# Patient Record
Sex: FEMALE | Employment: FULL TIME | URBAN - METROPOLITAN AREA
[De-identification: names, ages, dates, MRNs, and addresses within clinical notes are randomized per-mention and may not be internally consistent; named-entity substitution may affect disease eponyms.]

---

## 2018-04-24 ENCOUNTER — TRANSFERRED RECORDS (OUTPATIENT)
Dept: HEALTH INFORMATION MANAGEMENT | Facility: CLINIC | Age: 48
End: 2018-04-24

## 2018-06-01 ENCOUNTER — HOSPITAL ENCOUNTER (OUTPATIENT)
Facility: CLINIC | Age: 48
Discharge: HOME | End: 2018-06-02
Attending: OBSTETRICS & GYNECOLOGY | Admitting: OBSTETRICS & GYNECOLOGY
Payer: COMMERCIAL

## 2018-06-01 ENCOUNTER — ANESTHESIA EVENT (OUTPATIENT)
Dept: SURGERY | Facility: CLINIC | Age: 48
End: 2018-06-01
Payer: COMMERCIAL

## 2018-06-01 ENCOUNTER — SURGERY (OUTPATIENT)
Age: 48
End: 2018-06-01
Payer: COMMERCIAL

## 2018-06-01 ENCOUNTER — ANESTHESIA (OUTPATIENT)
Dept: SURGERY | Facility: CLINIC | Age: 48
End: 2018-06-01
Payer: COMMERCIAL

## 2018-06-01 DIAGNOSIS — N81.2 UTEROVAGINAL PROLAPSE, INCOMPLETE: Primary | ICD-10-CM

## 2018-06-01 LAB
ABO + RH BLD: NORMAL
ABO + RH BLD: NORMAL
B-HCG SERPL-ACNC: <1 IU/L (ref 0–5)
BLD GP AB SCN SERPL QL: NORMAL
BLOOD BANK CMNT PATIENT-IMP: NORMAL
HGB BLD-MCNC: 12 G/DL (ref 11.7–15.7)
SPECIMEN EXP DATE BLD: NORMAL

## 2018-06-01 PROCEDURE — 25000128 H RX IP 250 OP 636: Performed by: NURSE ANESTHETIST, CERTIFIED REGISTERED

## 2018-06-01 PROCEDURE — 71000013 ZZH RECOVERY PHASE 1 LEVEL 1 EA ADDTL HR: Performed by: OBSTETRICS & GYNECOLOGY

## 2018-06-01 PROCEDURE — 25000132 ZZH RX MED GY IP 250 OP 250 PS 637: Performed by: OBSTETRICS & GYNECOLOGY

## 2018-06-01 PROCEDURE — 40000936 ZZH STATISTIC OUTPATIENT (NON-OBS) NIGHT

## 2018-06-01 PROCEDURE — 25000128 H RX IP 250 OP 636: Performed by: OBSTETRICS & GYNECOLOGY

## 2018-06-01 PROCEDURE — 27210794 ZZH OR GENERAL SUPPLY STERILE: Performed by: OBSTETRICS & GYNECOLOGY

## 2018-06-01 PROCEDURE — 25000565 ZZH ISOFLURANE, EA 15 MIN: Performed by: OBSTETRICS & GYNECOLOGY

## 2018-06-01 PROCEDURE — 86850 RBC ANTIBODY SCREEN: CPT | Performed by: OBSTETRICS & GYNECOLOGY

## 2018-06-01 PROCEDURE — 25000128 H RX IP 250 OP 636: Performed by: ANESTHESIOLOGY

## 2018-06-01 PROCEDURE — 37000008 ZZH ANESTHESIA TECHNICAL FEE, 1ST 30 MIN: Performed by: OBSTETRICS & GYNECOLOGY

## 2018-06-01 PROCEDURE — 88305 TISSUE EXAM BY PATHOLOGIST: CPT | Mod: 26 | Performed by: OBSTETRICS & GYNECOLOGY

## 2018-06-01 PROCEDURE — 84702 CHORIONIC GONADOTROPIN TEST: CPT | Performed by: OBSTETRICS & GYNECOLOGY

## 2018-06-01 PROCEDURE — 25000125 ZZHC RX 250: Performed by: NURSE ANESTHETIST, CERTIFIED REGISTERED

## 2018-06-01 PROCEDURE — 40000935 ZZH STATISTIC OUTPATIENT (NON-OBS) EVE

## 2018-06-01 PROCEDURE — 27210995 ZZH RX 272: Performed by: OBSTETRICS & GYNECOLOGY

## 2018-06-01 PROCEDURE — 85018 HEMOGLOBIN: CPT | Performed by: OBSTETRICS & GYNECOLOGY

## 2018-06-01 PROCEDURE — 36415 COLL VENOUS BLD VENIPUNCTURE: CPT | Performed by: OBSTETRICS & GYNECOLOGY

## 2018-06-01 PROCEDURE — 40000170 ZZH STATISTIC PRE-PROCEDURE ASSESSMENT II: Performed by: OBSTETRICS & GYNECOLOGY

## 2018-06-01 PROCEDURE — 88305 TISSUE EXAM BY PATHOLOGIST: CPT | Performed by: OBSTETRICS & GYNECOLOGY

## 2018-06-01 PROCEDURE — 25000125 ZZHC RX 250: Performed by: ANESTHESIOLOGY

## 2018-06-01 PROCEDURE — 37000009 ZZH ANESTHESIA TECHNICAL FEE, EACH ADDTL 15 MIN: Performed by: OBSTETRICS & GYNECOLOGY

## 2018-06-01 PROCEDURE — 25000125 ZZHC RX 250: Performed by: OBSTETRICS & GYNECOLOGY

## 2018-06-01 PROCEDURE — 36000085 ZZH SURGERY LEVEL 8 1ST 30 MIN: Performed by: OBSTETRICS & GYNECOLOGY

## 2018-06-01 PROCEDURE — 86900 BLOOD TYPING SEROLOGIC ABO: CPT | Performed by: OBSTETRICS & GYNECOLOGY

## 2018-06-01 PROCEDURE — 25000128 H RX IP 250 OP 636

## 2018-06-01 PROCEDURE — 86901 BLOOD TYPING SEROLOGIC RH(D): CPT | Performed by: OBSTETRICS & GYNECOLOGY

## 2018-06-01 PROCEDURE — 25000125 ZZHC RX 250

## 2018-06-01 PROCEDURE — 36000087 ZZH SURGERY LEVEL 8 EA 15 ADDTL MIN: Performed by: OBSTETRICS & GYNECOLOGY

## 2018-06-01 PROCEDURE — 71000012 ZZH RECOVERY PHASE 1 LEVEL 1 FIRST HR: Performed by: OBSTETRICS & GYNECOLOGY

## 2018-06-01 PROCEDURE — C1781 MESH (IMPLANTABLE): HCPCS | Performed by: OBSTETRICS & GYNECOLOGY

## 2018-06-01 PROCEDURE — 25800025 ZZH RX 258: Performed by: OBSTETRICS & GYNECOLOGY

## 2018-06-01 DEVICE — MESH SLING ARTISYN SACROCOLPOPEXY ARTY5L: Type: IMPLANTABLE DEVICE | Site: PELVIS | Status: FUNCTIONAL

## 2018-06-01 RX ORDER — BUPIVACAINE HYDROCHLORIDE AND EPINEPHRINE 2.5; 5 MG/ML; UG/ML
INJECTION, SOLUTION INFILTRATION; PERINEURAL PRN
Status: DISCONTINUED | OUTPATIENT
Start: 2018-06-01 | End: 2018-06-01 | Stop reason: HOSPADM

## 2018-06-01 RX ORDER — ONDANSETRON 4 MG/1
4 TABLET, ORALLY DISINTEGRATING ORAL EVERY 30 MIN PRN
Status: DISCONTINUED | OUTPATIENT
Start: 2018-06-01 | End: 2018-06-01 | Stop reason: HOSPADM

## 2018-06-01 RX ORDER — IBUPROFEN 600 MG/1
600 TABLET, FILM COATED ORAL EVERY 6 HOURS PRN
Status: DISCONTINUED | OUTPATIENT
Start: 2018-06-02 | End: 2018-06-02 | Stop reason: HOSPADM

## 2018-06-01 RX ORDER — IBUPROFEN 600 MG/1
600 TABLET, FILM COATED ORAL EVERY 6 HOURS PRN
Qty: 60 TABLET | Refills: 0 | Status: SHIPPED | OUTPATIENT
Start: 2018-06-01

## 2018-06-01 RX ORDER — NEOSTIGMINE METHYLSULFATE 1 MG/ML
VIAL (ML) INJECTION PRN
Status: DISCONTINUED | OUTPATIENT
Start: 2018-06-01 | End: 2018-06-01

## 2018-06-01 RX ORDER — LIDOCAINE HYDROCHLORIDE 20 MG/ML
INJECTION, SOLUTION INFILTRATION; PERINEURAL PRN
Status: DISCONTINUED | OUTPATIENT
Start: 2018-06-01 | End: 2018-06-01

## 2018-06-01 RX ORDER — ONDANSETRON 4 MG/1
4 TABLET, ORALLY DISINTEGRATING ORAL EVERY 6 HOURS PRN
Status: DISCONTINUED | OUTPATIENT
Start: 2018-06-01 | End: 2018-06-02 | Stop reason: HOSPADM

## 2018-06-01 RX ORDER — HYDROMORPHONE HYDROCHLORIDE 1 MG/ML
.3-.5 INJECTION, SOLUTION INTRAMUSCULAR; INTRAVENOUS; SUBCUTANEOUS EVERY 10 MIN PRN
Status: DISCONTINUED | OUTPATIENT
Start: 2018-06-01 | End: 2018-06-01 | Stop reason: HOSPADM

## 2018-06-01 RX ORDER — PROPOFOL 10 MG/ML
INJECTION, EMULSION INTRAVENOUS PRN
Status: DISCONTINUED | OUTPATIENT
Start: 2018-06-01 | End: 2018-06-01

## 2018-06-01 RX ORDER — LIDOCAINE 40 MG/G
CREAM TOPICAL
Status: DISCONTINUED | OUTPATIENT
Start: 2018-06-01 | End: 2018-06-02 | Stop reason: HOSPADM

## 2018-06-01 RX ORDER — NALOXONE HYDROCHLORIDE 0.4 MG/ML
.1-.4 INJECTION, SOLUTION INTRAMUSCULAR; INTRAVENOUS; SUBCUTANEOUS
Status: DISCONTINUED | OUTPATIENT
Start: 2018-06-01 | End: 2018-06-02 | Stop reason: HOSPADM

## 2018-06-01 RX ORDER — OXYCODONE AND ACETAMINOPHEN 5; 325 MG/1; MG/1
1-2 TABLET ORAL EVERY 4 HOURS PRN
Qty: 20 TABLET | Refills: 0 | Status: SHIPPED | OUTPATIENT
Start: 2018-06-01

## 2018-06-01 RX ORDER — MAGNESIUM HYDROXIDE 1200 MG/15ML
LIQUID ORAL PRN
Status: DISCONTINUED | OUTPATIENT
Start: 2018-06-01 | End: 2018-06-01 | Stop reason: HOSPADM

## 2018-06-01 RX ORDER — ONDANSETRON 2 MG/ML
4 INJECTION INTRAMUSCULAR; INTRAVENOUS EVERY 30 MIN PRN
Status: DISCONTINUED | OUTPATIENT
Start: 2018-06-01 | End: 2018-06-01 | Stop reason: HOSPADM

## 2018-06-01 RX ORDER — OXYCODONE AND ACETAMINOPHEN 5; 325 MG/1; MG/1
1-2 TABLET ORAL EVERY 4 HOURS PRN
Status: DISCONTINUED | OUTPATIENT
Start: 2018-06-01 | End: 2018-06-02 | Stop reason: HOSPADM

## 2018-06-01 RX ORDER — DEXAMETHASONE SODIUM PHOSPHATE 4 MG/ML
INJECTION, SOLUTION INTRA-ARTICULAR; INTRALESIONAL; INTRAMUSCULAR; INTRAVENOUS; SOFT TISSUE PRN
Status: DISCONTINUED | OUTPATIENT
Start: 2018-06-01 | End: 2018-06-01

## 2018-06-01 RX ORDER — NALOXONE HYDROCHLORIDE 0.4 MG/ML
.1-.4 INJECTION, SOLUTION INTRAMUSCULAR; INTRAVENOUS; SUBCUTANEOUS
Status: DISCONTINUED | OUTPATIENT
Start: 2018-06-01 | End: 2018-06-01 | Stop reason: HOSPADM

## 2018-06-01 RX ORDER — ALUMINA, MAGNESIA, AND SIMETHICONE 2400; 2400; 240 MG/30ML; MG/30ML; MG/30ML
30 SUSPENSION ORAL EVERY 4 HOURS PRN
Status: DISCONTINUED | OUTPATIENT
Start: 2018-06-01 | End: 2018-06-02 | Stop reason: HOSPADM

## 2018-06-01 RX ORDER — METOCLOPRAMIDE 10 MG/1
10 TABLET ORAL EVERY 6 HOURS PRN
Status: DISCONTINUED | OUTPATIENT
Start: 2018-06-01 | End: 2018-06-02 | Stop reason: HOSPADM

## 2018-06-01 RX ORDER — PROCHLORPERAZINE MALEATE 10 MG
10 TABLET ORAL EVERY 6 HOURS PRN
Status: DISCONTINUED | OUTPATIENT
Start: 2018-06-01 | End: 2018-06-02 | Stop reason: HOSPADM

## 2018-06-01 RX ORDER — METOCLOPRAMIDE HYDROCHLORIDE 5 MG/ML
10 INJECTION INTRAMUSCULAR; INTRAVENOUS EVERY 6 HOURS PRN
Status: DISCONTINUED | OUTPATIENT
Start: 2018-06-01 | End: 2018-06-02 | Stop reason: HOSPADM

## 2018-06-01 RX ORDER — VECURONIUM BROMIDE 1 MG/ML
INJECTION, POWDER, LYOPHILIZED, FOR SOLUTION INTRAVENOUS PRN
Status: DISCONTINUED | OUTPATIENT
Start: 2018-06-01 | End: 2018-06-01

## 2018-06-01 RX ORDER — ALBUTEROL SULFATE 0.83 MG/ML
2.5 SOLUTION RESPIRATORY (INHALATION) ONCE
Status: COMPLETED | OUTPATIENT
Start: 2018-06-01 | End: 2018-06-01

## 2018-06-01 RX ORDER — SODIUM CHLORIDE 9 MG/ML
INJECTION, SOLUTION INTRAVENOUS CONTINUOUS PRN
Status: DISCONTINUED | OUTPATIENT
Start: 2018-06-01 | End: 2018-06-01

## 2018-06-01 RX ORDER — FENTANYL CITRATE 50 UG/ML
INJECTION, SOLUTION INTRAMUSCULAR; INTRAVENOUS PRN
Status: DISCONTINUED | OUTPATIENT
Start: 2018-06-01 | End: 2018-06-01

## 2018-06-01 RX ORDER — CEFAZOLIN SODIUM 2 G/100ML
2 INJECTION, SOLUTION INTRAVENOUS
Status: COMPLETED | OUTPATIENT
Start: 2018-06-01 | End: 2018-06-01

## 2018-06-01 RX ORDER — FENTANYL CITRATE 50 UG/ML
25-50 INJECTION, SOLUTION INTRAMUSCULAR; INTRAVENOUS
Status: DISCONTINUED | OUTPATIENT
Start: 2018-06-01 | End: 2018-06-01 | Stop reason: HOSPADM

## 2018-06-01 RX ORDER — GLYCOPYRROLATE 0.2 MG/ML
INJECTION, SOLUTION INTRAMUSCULAR; INTRAVENOUS PRN
Status: DISCONTINUED | OUTPATIENT
Start: 2018-06-01 | End: 2018-06-01

## 2018-06-01 RX ORDER — ONDANSETRON 2 MG/ML
4 INJECTION INTRAMUSCULAR; INTRAVENOUS EVERY 6 HOURS PRN
Status: DISCONTINUED | OUTPATIENT
Start: 2018-06-01 | End: 2018-06-02 | Stop reason: HOSPADM

## 2018-06-01 RX ORDER — HYDROMORPHONE HYDROCHLORIDE 1 MG/ML
0.2 INJECTION, SOLUTION INTRAMUSCULAR; INTRAVENOUS; SUBCUTANEOUS
Status: DISCONTINUED | OUTPATIENT
Start: 2018-06-01 | End: 2018-06-02 | Stop reason: HOSPADM

## 2018-06-01 RX ORDER — DEXTROSE, SODIUM CHLORIDE, SODIUM LACTATE, POTASSIUM CHLORIDE, AND CALCIUM CHLORIDE 5; .6; .31; .03; .02 G/100ML; G/100ML; G/100ML; G/100ML; G/100ML
INJECTION, SOLUTION INTRAVENOUS CONTINUOUS
Status: DISCONTINUED | OUTPATIENT
Start: 2018-06-01 | End: 2018-06-02 | Stop reason: HOSPADM

## 2018-06-01 RX ORDER — KETOROLAC TROMETHAMINE 30 MG/ML
30 INJECTION, SOLUTION INTRAMUSCULAR; INTRAVENOUS EVERY 6 HOURS
Status: COMPLETED | OUTPATIENT
Start: 2018-06-01 | End: 2018-06-02

## 2018-06-01 RX ORDER — CEFAZOLIN SODIUM 1 G/3ML
1 INJECTION, POWDER, FOR SOLUTION INTRAMUSCULAR; INTRAVENOUS SEE ADMIN INSTRUCTIONS
Status: DISCONTINUED | OUTPATIENT
Start: 2018-06-01 | End: 2018-06-01 | Stop reason: HOSPADM

## 2018-06-01 RX ORDER — ONDANSETRON 2 MG/ML
INJECTION INTRAMUSCULAR; INTRAVENOUS PRN
Status: DISCONTINUED | OUTPATIENT
Start: 2018-06-01 | End: 2018-06-01

## 2018-06-01 RX ORDER — ACETAMINOPHEN 325 MG/1
650 TABLET ORAL EVERY 6 HOURS PRN
Status: DISCONTINUED | OUTPATIENT
Start: 2018-06-01 | End: 2018-06-02 | Stop reason: HOSPADM

## 2018-06-01 RX ORDER — SODIUM CHLORIDE, SODIUM LACTATE, POTASSIUM CHLORIDE, CALCIUM CHLORIDE 600; 310; 30; 20 MG/100ML; MG/100ML; MG/100ML; MG/100ML
INJECTION, SOLUTION INTRAVENOUS CONTINUOUS
Status: DISCONTINUED | OUTPATIENT
Start: 2018-06-01 | End: 2018-06-01 | Stop reason: HOSPADM

## 2018-06-01 RX ADMIN — OXYCODONE HYDROCHLORIDE AND ACETAMINOPHEN 1 TABLET: 5; 325 TABLET ORAL at 21:13

## 2018-06-01 RX ADMIN — CEFAZOLIN SODIUM 2 G: 2 INJECTION, SOLUTION INTRAVENOUS at 13:50

## 2018-06-01 RX ADMIN — LIDOCAINE HYDROCHLORIDE 80 MG: 20 INJECTION, SOLUTION INFILTRATION; PERINEURAL at 13:39

## 2018-06-01 RX ADMIN — SODIUM CHLORIDE: 9 INJECTION, SOLUTION INTRAVENOUS at 15:42

## 2018-06-01 RX ADMIN — VECURONIUM BROMIDE 1 MG: 1 INJECTION, POWDER, LYOPHILIZED, FOR SOLUTION INTRAVENOUS at 14:50

## 2018-06-01 RX ADMIN — FENTANYL CITRATE 150 MCG: 50 INJECTION, SOLUTION INTRAMUSCULAR; INTRAVENOUS at 14:02

## 2018-06-01 RX ADMIN — DEXAMETHASONE SODIUM PHOSPHATE 4 MG: 4 INJECTION, SOLUTION INTRA-ARTICULAR; INTRALESIONAL; INTRAMUSCULAR; INTRAVENOUS; SOFT TISSUE at 14:00

## 2018-06-01 RX ADMIN — PROPOFOL 160 MG: 10 INJECTION, EMULSION INTRAVENOUS at 13:39

## 2018-06-01 RX ADMIN — GLYCOPYRROLATE 0.6 MG: 0.2 INJECTION, SOLUTION INTRAMUSCULAR; INTRAVENOUS at 15:26

## 2018-06-01 RX ADMIN — BUPIVACAINE HYDROCHLORIDE AND EPINEPHRINE BITARTRATE 30 ML: 2.5; .005 INJECTION, SOLUTION EPIDURAL; INFILTRATION; INTRACAUDAL; PERINEURAL at 15:27

## 2018-06-01 RX ADMIN — SODIUM CHLORIDE 1000 ML: 900 IRRIGANT IRRIGATION at 14:01

## 2018-06-01 RX ADMIN — SODIUM CHLORIDE: 9 INJECTION, SOLUTION INTRAVENOUS at 13:37

## 2018-06-01 RX ADMIN — HYDROMORPHONE HYDROCHLORIDE 0.5 MG: 10 INJECTION, SOLUTION INTRAMUSCULAR; INTRAVENOUS; SUBCUTANEOUS at 15:41

## 2018-06-01 RX ADMIN — ONDANSETRON 4 MG: 2 INJECTION INTRAMUSCULAR; INTRAVENOUS at 21:11

## 2018-06-01 RX ADMIN — ROCURONIUM BROMIDE 30 MG: 10 INJECTION INTRAVENOUS at 13:39

## 2018-06-01 RX ADMIN — MIDAZOLAM 2 MG: 1 INJECTION INTRAMUSCULAR; INTRAVENOUS at 13:38

## 2018-06-01 RX ADMIN — SODIUM CHLORIDE, SODIUM LACTATE, POTASSIUM CHLORIDE, CALCIUM CHLORIDE AND DEXTROSE MONOHYDRATE: 5; 600; 310; 30; 20 INJECTION, SOLUTION INTRAVENOUS at 19:01

## 2018-06-01 RX ADMIN — ALBUTEROL SULFATE 2.5 MG: 2.5 SOLUTION RESPIRATORY (INHALATION) at 12:59

## 2018-06-01 RX ADMIN — NEOSTIGMINE METHYLSULFATE 3 MG: 1 INJECTION, SOLUTION INTRAVENOUS at 15:26

## 2018-06-01 RX ADMIN — FENTANYL CITRATE 100 MCG: 50 INJECTION, SOLUTION INTRAMUSCULAR; INTRAVENOUS at 13:39

## 2018-06-01 RX ADMIN — ONDANSETRON 4 MG: 2 INJECTION INTRAMUSCULAR; INTRAVENOUS at 13:50

## 2018-06-01 RX ADMIN — KETOROLAC TROMETHAMINE 30 MG: 30 INJECTION, SOLUTION INTRAMUSCULAR at 18:11

## 2018-06-01 RX ADMIN — KETOROLAC TROMETHAMINE 30 MG: 30 INJECTION, SOLUTION INTRAMUSCULAR at 23:59

## 2018-06-01 ASSESSMENT — ENCOUNTER SYMPTOMS: SEIZURES: 0

## 2018-06-01 NOTE — OR NURSING
Report called to OBS care, to Alia MOLINA.  Family brought back to PACU to go up with patient.  Transferred with IV and  Alcaraz intact.  NO vaginal drainage.  Lap sites dry.  Clothing and paper chart sent with patient.

## 2018-06-01 NOTE — ANESTHESIA PREPROCEDURE EVALUATION
Anesthesia Evaluation     . Pt has not had prior anesthetic            ROS/MED HX    ENT/Pulmonary:     (+), recent URI nasal congestion with post nasal drip/cough.  no fever, no sob, no sore throat, has been working.  substantially improved: . .   (-) sleep apnea   Neurologic:      (-) seizures and CVA   Cardiovascular:         METS/Exercise Tolerance:     Hematologic:         Musculoskeletal:         GI/Hepatic:        (-) GERD   Renal/Genitourinary:         Endo:         Psychiatric:         Infectious Disease:         Malignancy:         Other:                     Physical Exam  Normal systems: dental    Airway   Mallampati: I  TM distance: >3 FB  Neck ROM: full    Dental     Cardiovascular   Rhythm and rate: regular      Pulmonary    breath sounds clear to auscultation                    Anesthesia Plan      History & Physical Review  History and physical reviewed and following examination; no interval change.    ASA Status:  1 .        Plan for General and ETT with Intravenous induction. Maintenance will be Balanced.    PONV prophylaxis:  Ondansetron (or other 5HT-3) and Dexamethasone or Solumedrol       Postoperative Care  Postoperative pain management:  IV analgesics.      Consents  Anesthetic plan, risks, benefits and alternatives discussed with:  Patient..                          .

## 2018-06-01 NOTE — IP AVS SNAPSHOT
[facility]    [address] [place] [id number]    Phone:  [phone]                                       After Visit Summary   6/1/2018    Irene Simeon    MRN: [id number]           After Visit Summary Signature Page     I have received my discharge instructions, and my questions have been answered. I have discussed any challenges I see with this plan with the nurse or doctor.    ..........................................................................................................................................  Patient/Patient Representative Signature      ..........................................................................................................................................  Patient Representative Print Name and Relationship to Patient    ..................................................               ................................................  Date                                            Time    ..........................................................................................................................................  Reviewed by Signature/Title    ...................................................              ..............................................  Date                                                            Time

## 2018-06-01 NOTE — IP AVS SNAPSHOT
MRN:[id number]                      After Visit Summary   6/1/2018    Irene Simeon    MRN: [id number]           Thank you!     Thank you for choosing [facility] for your care. Our goal is always to provide you with excellent care. Hearing back from our patients is one way we can continue to improve our services. Please take a few minutes to complete the written survey that you may receive in the mail after you visit with us. Thank you!        Patient Information     Date Of Birth          1970        Designated Caregiver       Most Recent Value    Caregiver    Will someone help with your care after discharge? yes    Name of designated caregiver  Bradly    Phone number of caregiver     Caregiver address [address], [place] [address]      About your hospital stay     You were admitted on:  June 1, 2018 [facility]    You were discharged on:  June 2, 2018       Who to Call     For medical emergencies, please call 911.  For non-urgent questions about your medical care, please call your primary care provider or clinic, [phone]  For questions related to your surgery, please call your surgery clinic        Attending Provider     Provider Specialty    Chip Spencer MD OB/Gyn       Primary Care Provider Office Phone # Fax #    Chip Spencer [phone]      After Care Instructions     Diet Instructions       Resume pre-procedure diet            Discharge Instructions       Follow up appointment as instructed by Surgeon and or RN            Ice to affected area       Ice to operative site PRN            No Alcohol       For 24 hours post procedure            No lifting        No lifting over 20 lbs and no strenuous physical activity for 6 weeks            Shower       No shower for 24 hours post procedure. May shower Postoperative Day (POD)  2                  Pending Results     No orders found for last  "3 day(s).            Admission Information     Date & Time Provider Department Dept. Phone    2018 Chip Spencer MD [facility] [phone]      Your Vitals Were     Blood Pressure Temperature Respirations Height Weight Last Period     (BP Location: Right arm) 97.5  F (36.4  C) (Oral) 14 1.6 m (5' 3\") 59.5 kg (131 lb 4 oz) 2018    Pulse Oximetry BMI (Body Mass Index)                100% 23.25 kg/m2          PayOrPass Information     PayOrPass lets you send messages to your doctor, view your test results, renew your prescriptions, schedule appointments and more. To sign up, go to [email or web address] . Click on \"Log in\" on the left side of the screen, which will take you to the Welcome page. Then click on \"Sign up Now\" on the right side of the page.     You will be asked to enter the access code listed below, as well as some personal information. Please follow the directions to create your username and password.     Your access code is: [email or web address]  Expires: 2018 12:35 PM     Your access code will  in 90 days. If you need help or a new code, please call your [facility] clinic or [phone].        Care EveryWhere ID     This is your Care EveryWhere ID. This could be used by other organizations to access your [facility] medical records  [id number]        Equal Access to Services     ERICK PERKINS AH: Hadii kyrie stephenso Somaddie, waaxda luqadaha, qaybta kaalmada soila, jing hook adekristopher barlow. So [facility] [phone].    ATENCIÓN: Si habla español, tiene a solomon disposición servicios gratuitos de asistencia lingüística. Llame al [phone].    We comply with applicable federal civil rights laws and [place] laws. We do not discriminate on the basis of race, color, national origin, age, disability, sex, sexual orientation, or gender identity.               Review of your medicines      START taking        Dose / Directions    ibuprofen 600 MG tablet "   Commonly known as:  ADVIL/MOTRIN        Dose:  600 mg   Take 1 tablet (600 mg) by mouth every 6 hours as needed for pain (mild)   Quantity:  60 tablet   Refills:  0       oxyCODONE-acetaminophen 5-325 MG per tablet   Commonly known as:  PERCOCET        Dose:  1-2 tablet   Take 1-2 tablets by mouth every 4 hours as needed for pain (moderate to severe)   Quantity:  20 tablet   Refills:  0            Where to get your medicines      Some of these will need a paper prescription and others can be bought over the counter. Ask your nurse if you have questions.     Bring a paper prescription for each of these medications     ibuprofen 600 MG tablet    oxyCODONE-acetaminophen 5-325 MG per tablet                Protect others around you: Learn how to safely use, store and throw away your medicines at www.disposemymeds.org.        Information about OPIOIDS     PRESCRIPTION OPIOIDS: WHAT YOU NEED TO KNOW   You have a prescription for an opioid (narcotic) pain medicine. Opioids can cause addiction. If you have a history of chemical dependency of any type, you are at a higher risk of becoming addicted to opioids. Only take this medicine after all other options have been tried. Take it for as short a time and as few doses as possible.     Do not:    Drive. If you drive while taking these medicines, you could be arrested for driving under the influence (DUI).    Operate heavy machinery    Do any other dangerous activities while taking these medicines.     Drink any alcohol while taking these medicines.      Take with any other medicines that contain acetaminophen. Read all labels carefully. Look for the word  acetaminophen  or  Tylenol.  Ask your pharmacist if you have questions or are unsure.    Store your pills in a secure place, locked if possible. We will not replace any lost or stolen medicine. If you don t finish your medicine, please throw away (dispose) as directed by your pharmacist. The [place] [employer]ution Control  [employer]ency has more information about safe disposal: https://www.pca.[facility].[place].us/living-green/managing-unwanted-medications    All opioids tend to cause constipation. Drink plenty of water and eat foods that have a lot of fiber, such as fruits, vegetables, prune juice, apple juice and high-fiber cereal. Take a laxative (Miralax, milk of magnesia, Colace, Senna) if you don t move your bowels at least every other day.              Medication List: This is a list of all your medications and when to take them. Check marks below indicate your daily home schedule. Keep this list as a reference.      Medications           Morning Afternoon Evening Bedtime As Needed    ibuprofen 600 MG tablet   Commonly known as:  ADVIL/MOTRIN   Take 1 tablet (600 mg) by mouth every 6 hours as needed for pain (mild)   Next Dose Due:  Available at 6:30 PM                                   oxyCODONE-acetaminophen 5-325 MG per tablet   Commonly known as:  PERCOCET   Take 1-2 tablets by mouth every 4 hours as needed for pain (moderate to severe)   Last time this was given:  1 tablet on 6/2/2018  8:20 AM                                             More Information        After Abdominal Hysterectomy for Uterine Prolapse  Uterine prolapse occurs when the uterus drops into the vagina. In severe cases, the uterus can protrude from the vagina. The goal of surgery is to correct this problem and relieve your symptoms. You had a procedure called abdominal hysterectomy. A surgeon removed your uterus through an incision in your abdomen. It usually takes about 3 to 8 weeks to recover from abdominal hysterectomy, but recovery time varies from woman to woman. Here's what you can do to speed your recovery.  Activity    Ask your friends and family to help with chores and errands while you recover.    Don t lift anything heavier than 10 pounds for 6 weeks to avoid straining your incisions.    Don t push a vacuum or do other strenuous housework until your  healthcare provider says it s OK.    Climb stairs slowly and pause after every few steps.    Continue with the coughing and deep breathing exercises that you learned in the hospital.    Don t drive until your healthcare provider says it s OK.    Ask your healthcare provider when you can expect to return to work.    Walk as often as you feel able.    Don't put anything in your vagina until your healthcare provider says it's OK to do so. This includes tampons, douches, and sexual activity.  Other home care    Avoid constipation.  ? Eat fruits, vegetables, and whole grains.  ? Drink 6 to 8 glasses of water every day, unless directed otherwise.  ? Use a laxative or a mild stool softener if your healthcare provider says it s OK.    Wash your incision with mild soap and water. Pat it dry. Don t use oils, powders, or lotions on your incision.    Shower as usual.    Don t have sex or use tampons or douches until your healthcare provider says it s safe to do so.    Report hot flashes, mood swings, and irritability to your healthcare provider. There may be medicines that can help you.  Follow-up care  Make a follow-up appointment, or as directed.  When to call your healthcare provider  Call your healthcare provider right away if you have any of the following:    Fever above 100.4 F (38.0  C) or chills    Bright red vaginal bleeding or foul smelling discharge    Vaginal bleeding that soaks more than one sanitary pad per hour    Trouble urinating or a burning sensation when you urinate    Severe abdominal pain or bloating    Redness, swelling, or drainage at your incision site    Shortness of breath    Shortness of breath or chest pain    Nausea and vomiting   Date Last Reviewed: 11/1/2017 2000-2017 [employer]. [address] [place] [address]. All rights reserved. This information is not intended as a substitute for professional medical care. Always follow your healthcare professional's  instructions.

## 2018-06-01 NOTE — ANESTHESIA POSTPROCEDURE EVALUATION
Patient: Irene Simeon    Procedure(s):  DAVINCI XI SUPRACERVICAL HYSTERECTOMY; BILATERAL SALPINGECTOMY; SACROCOLPOPEXY; CYSTOSCOPY  - Wound Class: II-Clean Contaminated   - Wound Class: II-Clean Contaminated    Diagnosis:UTERINE PROLAPSE   Diagnosis Additional Information: No value filed.    Anesthesia Type:  General, ETT    Note:  Anesthesia Post Evaluation    Patient location during evaluation: PACU  Patient participation: Able to fully participate in evaluation  Level of consciousness: awake and alert  Pain management: adequate  Airway patency: patent  Cardiovascular status: acceptable  Respiratory status: acceptable  Hydration status: acceptable  PONV: none     Anesthetic complications: None          Last vitals:  Vitals:    06/01/18 1645 06/01/18 1700 06/01/18 1715   BP: 102/64 104/65 102/65   Resp: 16 16 16   Temp: 36.7  C (98  F) 36.7  C (98.1  F) 36.8  C (98.2  F)   SpO2: 100% 100% 100%         Electronically Signed By: Sea Liang MD  June 1, 2018  5:49 PM

## 2018-06-01 NOTE — ANESTHESIA CARE TRANSFER NOTE
Patient: Irene Simeon    Procedure(s):  DAVINCI XI SUPRACERVICAL HYSTERECTOMY; BILATERAL SALPINGECTOMY; SACROCOLPOPEXY; CYSTOSCOPY  - Wound Class: II-Clean Contaminated   - Wound Class: II-Clean Contaminated    Diagnosis: UTERINE PROLAPSE   Diagnosis Additional Information: No value filed.    Anesthesia Type:   General, ETT     Note:  Airway :Face Mask  Patient transferred to:Phase II  Comments: BP: 104/65     Resp: 16  SpO2: 100 %  Temp: 36.8  C (98.3  F)    TOF 4/4 with sustained tetany > 5secs. Spontaneous resp with tidal volume >400ml.. Followed commands et purposeful. Strong tongue thrust. Extubated with cuff down. Pt maintains resp. O2 sat > 97%. Simple face mask on with 10l O2.To PACU: VSS, placed on monitors with alarms on. Report given to RN.;       Vitals: (Last set prior to Anesthesia Care Transfer)    CRNA VITALS  6/1/2018 1531 - 6/1/2018 1609      6/1/2018             NIBP: 98/77    NIBP Mean: 86                Electronically Signed By: NAN Foley CRNA  June 1, 2018  4:09 PM

## 2018-06-01 NOTE — PROVIDER NOTIFICATION
MD Notification    Notified Person: MD    Notified Person Name: Gloria Cardenas    Notification Time: 18:05    Notification Interaction: spoke on phone    Purpose of Notification: Pt's BP running low, no oral intake. Requesting order for IV fluids.     Orders Received: Dextrose 5% in LR @100 mL/hr

## 2018-06-02 VITALS
TEMPERATURE: 97.5 F | BODY MASS INDEX: 23.25 KG/M2 | DIASTOLIC BLOOD PRESSURE: 51 MMHG | WEIGHT: 131.25 LBS | RESPIRATION RATE: 14 BRPM | HEIGHT: 63 IN | SYSTOLIC BLOOD PRESSURE: 96 MMHG | OXYGEN SATURATION: 100 %

## 2018-06-02 LAB — GLUCOSE BLDC GLUCOMTR-MCNC: 96 MG/DL (ref 70–99)

## 2018-06-02 PROCEDURE — 25000128 H RX IP 250 OP 636: Performed by: OBSTETRICS & GYNECOLOGY

## 2018-06-02 PROCEDURE — 82962 GLUCOSE BLOOD TEST: CPT

## 2018-06-02 PROCEDURE — 25000132 ZZH RX MED GY IP 250 OP 250 PS 637: Performed by: OBSTETRICS & GYNECOLOGY

## 2018-06-02 PROCEDURE — 40000934 ZZH STATISTIC OUTPATIENT (NON-OBS) DAY

## 2018-06-02 RX ADMIN — OXYCODONE HYDROCHLORIDE AND ACETAMINOPHEN 1 TABLET: 5; 325 TABLET ORAL at 13:16

## 2018-06-02 RX ADMIN — KETOROLAC TROMETHAMINE 30 MG: 30 INJECTION, SOLUTION INTRAMUSCULAR at 06:22

## 2018-06-02 RX ADMIN — KETOROLAC TROMETHAMINE 30 MG: 30 INJECTION, SOLUTION INTRAMUSCULAR at 12:35

## 2018-06-02 RX ADMIN — OXYCODONE HYDROCHLORIDE AND ACETAMINOPHEN 1 TABLET: 5; 325 TABLET ORAL at 08:20

## 2018-06-02 NOTE — PLAN OF CARE
Problem: Patient Care Overview  Goal: Plan of Care/Patient Progress Review  Outcome: Improving  PT A&O  CMS intact  VSS except soft BP folie. Folie Removed  Due to void, encourage oral fluid . Lap sites CDI.  Pain control with Toradol  effective. Pt dangle at bed side and walk in the hallway dermatitis/c plan pending  plan to continue to  Monitor.

## 2018-06-02 NOTE — OP NOTE
Procedure Date: 06/01/2018      PREOPERATIVE DIAGNOSIS:  uterovaginal prolapse, incomplete.      POSTOPERATIVE DIAGNOSIS:  Uterovaginal prolapse, incomplete.      PROCEDURES:   1.  Da Brook Xi supracervical hysterectomy.   2.  Bilateral salpingectomy.   3.  Sacrocolpopexy.   4.  Cystoscopy.      SURGEON:  Chip Spencer MD      ASSISTANT:  Montserrat Betancourt.      ANESTHESIA:  General.      ESTIMATED BLOOD LOSS:  20 mL.      COMPLICATIONS:  None.      SPECIMENS:  The patient had uterus and bilateral fallopian tubes sent to pathology.      FINDINGS:  Preoperatively, the cervix was seen just past the opening of the vagina.  Postoperatively, we restored it to its correct position, stage 0.  She does have an elongated cervix, and I mentioned to the  and the patient that we may have to do a LEEP procedure in the future to debulk this if it is a problem or an obstruction with normal coitus.  We did do cystoscopy post-procedure, and the ureters were opened and there was no injury to the bladder.  We did a coude test with the bladder roughly hydrodistended to 400 mL and she had no demonstrable leakage.      DESCRIPTION OF OPERATIVE PROCEDURE:  After obtaining informed consent, the patient was prepped and draped in the usual manner for abdominal vaginal procedure.  A speculum was placed in the vagina.  We inserted the VCare device with a medium cup.  Speculum was removed.  We placed a Alcaraz catheter.  Gloves were changed, and attention was turned to the abdomen.      We created a 3 cm umbilical skin incision and carried it down to the fascia.  Fascia was grasped with Kocher clamps, and we entered with Danielle scissors.  We extended the fascial closure superiorly and inferiorly.  We then entered the parietoperitoneum bluntly with a finger, and we placed the wound retractor element of the GelPOINT set.  We then placed the GelPOINT cap with the assistant port and camera ports inserted.  Pneumoperitoneum was created to 15, and I  placed bilateral da Brook ports to the right and left.  The patient was placed in steep Trendelenburg positioning at 29 degrees, and the da Brook robot was docked.  I used a fenestrated bipolar grasper in the left hand and a hot sebastian in the right hand.  I took my place at the operative console.      We created a hole in the mesosalpinx to the left and used a fenestrated bipolar grasper to electrocoagulate and remove the fallopian tube from the superior pole of the ovary.  We then electrocoagulated the left utero-ovarian ligament with the fenestrated bipolar grasper and cut it with scissors.  We did the same to the midpoint of the round ligament, cutting it with scissors.  We were then inside the broad ligament, and this was bluntly dissected in the anterior and posterior leaves.  The posterior leaf was cut down to the uterosacral attachment to the cervix.  The anterior leaf was cut down to the cervical isthmus, carrying this dissection across the vesicouterine peritoneal fold, coming up a little bit on the right side.  We then created a small bladder flap at this point, and we electrocoagulated the left uterine artery and vein at the cervical isthmus x2 with a fenestrated bipolar grasper, and we cut the middle with scissors.  A similar dissection took place at the patient's right side and without difficulty.  We then amputated the uterus from the cervix at the cervical isthmus.  The uterus was stowed in the upper abdomen for later morselization.  At this point, we created a pocket anteriorly approximately 5 cm deep.  We then created a pocket posteriorly between the peritoneum and the vaginal wall that was approximately 6 cm deep.  I oversewed the cervix with CovIronGate V-Loc 180 stitch to close it shut so the external os was unable to communicate with the interior of the abdomen.  The needle was removed from the field, and I used the scissors with electrocautery to cut through the retroperitoneum overlying L5 at the  sacral promontory.  We then did blunt dissection down, and I could see the middle sacral artery.  This was electrocoagulated x2 with a fenestrated bipolar grasper, I tunneled out in the distribution of the right uterosacral ligament coming out close to the cervical stump using the fenestrated bipolar grasper.  An incision was used here to cut, and the fenestrated bipolar grasper was pulled through with a fully dissected peritoneal bridge.  We then cut the Artisyn  mesh to fit, and it was sewn in anteriorly with 7 stitches of 2-0 Quail-Dimitry stitch.  We sewed it in posteriorly with approximately 5-6 simple interrupted stitches of 2-0 Quail-Dimitry.  We then pulled the mesh strap through the peritoneal bridge and under appropriate tension, sewed it to the sacral promontory and the anterior longitudinal ligament using two simple interrupted stitches of 2-0 Quail-Dimitry.  Excess mesh was cut at the sacral promontory, and we oversewed this retroperitoneum with a figure-of-eight stitch of 3-0 Vicryl.  I closed the lower field peritoneum with a 12-inch length of 3-0 Covidien V-Loc 180 stitch.  Photographs were taken.  I then handed the uterus to my assistant who grabbed it with a locking grasper and scrubbed back into the case.      The da Brook robot was undocked.  The trocars were removed.  I pulled the uterus to the undersurface of the umbilicus and eventually was able to grab it with a perforating towel clip.   I did 2 release cuts with the 11 blade scalpel in order to pull it through the cavity but delivered rather easily.  It will be sent to pathology for further investigation.  I then removed the wound retractor element of the VCare and closed the fascia with 0 looped Maxon in a running stitch.  The skin was closed with 4-0 Vicryl and subcuticular stitches at all the port sites.  We then sealed each of the ports with Dermabond glue.      I took my place at the lower field and removed the Alcaraz catheter.  I placed a 20-Bangladeshi  cystoscope and hydrodistended.  Ureteral jets could be seen easily.  There was no bladder trauma or injury.  After instilling about 400-500 mL of fluid, I removed the cystoscope and did a coude test and found no leakage.  The Alcaraz catheter was reinserted, and the case was complete.  Needle, sponge, instrument counts were correct.  The patient tolerated the procedure well.      DISPOSITION:  The patient is in satisfactory stable condition and is in recovery.         ERIK FINN MD             D: 2018   T: 2018   MT: ELIZABETH      Name:     MARTÍNEZ BONILLA   MRN:      [id number]        Account:        [id number]   :      1970           Procedure Date: 2018      Document: [id number]

## 2018-06-02 NOTE — PLAN OF CARE
Problem: Patient Care Overview  Goal: Plan of Care/Patient Progress Review  Outcome: Improving  A&Ox4. Pain managed w/toradol. BP soft, other VSS on RA. Alcaraz patent and draining. Lap sites CDI. Tolerating a few sips of clears. IV infusing.

## 2018-06-02 NOTE — PLAN OF CARE
Problem: Patient Care Overview  Goal: Plan of Care/Patient Progress Review  Outcome: Therapy, progress towards functional goals is fair  PT A&O  VSS on capno/ RA  BP soft, a symptomatic . Alcaraz patent encourage oral fluid . Lap sites CDI. Tolerating  Clears gave Zofran for nausea and 1 tab percocet for  Pain effective. Pt dangle at bed side this shift tolerated well plan to continue to  Monitor.

## 2018-06-02 NOTE — PROGRESS NOTES
Doing well. Voiding trial seems to be going well. We will have her practice more. Call [phone] if there are questions. OK to go home.

## 2018-06-02 NOTE — BRIEF OP NOTE
[facility] Brief Operative Note    Pre-operative diagnosis: UTERINE PROLAPSE    Post-operative diagnosis Uterovaginal prolapse     Procedure: Procedure(s):  DAVINCI XI SUPRACERVICAL HYSTERECTOMY; BILATERAL SALPINGECTOMY; SACROCOLPOPEXY; CYSTOSCOPY  - Wound Class: II-Clean Contaminated   - Wound Class: II-Clean Contaminated   Surgeon(s): Surgeon(s) and Role:  Panel 1:     * Chip Spencer MD - Primary     * Montserrat Betancourt PA-C - Assisting    Panel 2:     * Chip Spencer MD - Primary     * Montserrat Betancourt PA-C - Assisting   Estimated blood loss: 20 mL    Specimens:   ID Type Source Tests Collected by Time Destination   A : Uterus, Bilateral Fallopian Tubes Tissue Uterus,  Bilateral Fallopian Tubes SURGICAL PATHOLOGY EXAM Chip Spencer MD 6/1/2018  3:21 PM       Findings:    Cervix just outside the opening of the vagina pre-op. Restored to Stage 0 Post-op. Cysto was negative and ureteral jets seen. No coude leakage either. Uterus removed with 2 release cuts thought the umbilicus.

## 2018-06-02 NOTE — PLAN OF CARE
A&Ox4. VSS on RA. Pain managed w/percocet and toradol. Voiding adequately. Ambulating ind. Tolerating diet. Plan to d/c this afternoon.

## 2018-06-03 NOTE — PLAN OF CARE
Discharge medications were filled and sent with pt. Pt educated on medications. Discharge instructions and education reviewed, medication schedule and follow up appts discussed. Pt had no questions. All belongings sent with pt.

## 2018-06-05 LAB — COPATH REPORT: NORMAL

## (undated) DEVICE — SU WND CLOSURE VLOC 180 ABS 0 6" GS-21 VLOCL0306

## (undated) DEVICE — DAVINCI XI DRAPE ARM 470015

## (undated) DEVICE — DAVINCI XI DRAPE COLUMN 470341

## (undated) DEVICE — GOWN IMPERVIOUS SPECIALTY XLG/XLONG 32474

## (undated) DEVICE — PAD CHUX UNDERPAD 23X24" 7136

## (undated) DEVICE — SYR 10ML FINGER CONTROL W/O NDL 309695

## (undated) DEVICE — SOL WATER IRRIG 1000ML BOTTLE 2F7114

## (undated) DEVICE — SU WND CLOSURE VLOC 180 ABS 3-0 12" V-20 VLOCL0614

## (undated) DEVICE — SOL NACL 0.9% INJ 1000ML BAG 2B1324X

## (undated) DEVICE — GLOVE PROTEXIS W/NEU-THERA 8.0  2D73TE80

## (undated) DEVICE — DAVINCI HOT SHEARS TIP COVER  400180

## (undated) DEVICE — ESU PENCIL W/HOLSTER E2350H

## (undated) DEVICE — SUCTION CANISTER MEDIVAC LINER 3000ML W/LID 65651-530

## (undated) DEVICE — DAVINCI XI SEAL UNIVERSAL 5-8MM 470361

## (undated) DEVICE — Device

## (undated) DEVICE — EVAC SYSTEM CLEAR FLOW SC082500

## (undated) DEVICE — SU VICRYL 3-0 SH 27" J316H

## (undated) DEVICE — DAVINCI TROCAR 8MM BLADELESS 470357

## (undated) DEVICE — GLOVE PROTEXIS W/NEU-THERA 7.0  2D73TE70

## (undated) DEVICE — KIT PATIENT POSITIONING PIGAZZI LATEX FREE 40580

## (undated) DEVICE — PACK DAVINCI GYN SMA15GDFS1

## (undated) DEVICE — TUBING IRRIG CYSTO/BLADDER SET 81" LF 2C4040

## (undated) DEVICE — GLOVE PROTEXIS BLUE W/NEU-THERA 7.0  2D73EB70

## (undated) DEVICE — SUCTION IRR STRYKERFLOW II W/TIP 250-070-520

## (undated) DEVICE — DEVICE SUTURE GRASPER TROCAR CLOSURE 14GA PMITCSG

## (undated) DEVICE — SU VICRYL 4-0 PS-2 18" UND J496H

## (undated) DEVICE — SU DERMABOND PROPEN .5ML DPP6

## (undated) DEVICE — TUBING SUCTION 12"X1/4" N612

## (undated) DEVICE — UTERINE MANIPULATOR RUMI TIP SACROCOLPOPEXY DST END SACRO-1C

## (undated) DEVICE — ENDO ACCESS PLATFORM GELPOINT SGL INCISION CNGL2

## (undated) DEVICE — LINEN TOWEL PACK X5 5464

## (undated) DEVICE — SOL WATER IRRIG 3000ML BAG 2B7117

## (undated) DEVICE — CATH TRAY FOLEY SURESTEP 16FR WDRAIN BAG STLK LATEX A300316A

## (undated) RX ORDER — ONDANSETRON 2 MG/ML
INJECTION INTRAMUSCULAR; INTRAVENOUS
Status: DISPENSED
Start: 2018-06-01

## (undated) RX ORDER — PROPOFOL 10 MG/ML
INJECTION, EMULSION INTRAVENOUS
Status: DISPENSED
Start: 2018-06-01

## (undated) RX ORDER — EPINEPHRINE 1 MG/ML
INJECTION, SOLUTION INTRAMUSCULAR; SUBCUTANEOUS
Status: DISPENSED
Start: 2018-06-01

## (undated) RX ORDER — LIDOCAINE HYDROCHLORIDE 20 MG/ML
INJECTION, SOLUTION EPIDURAL; INFILTRATION; INTRACAUDAL; PERINEURAL
Status: DISPENSED
Start: 2018-06-01

## (undated) RX ORDER — CEFAZOLIN SODIUM 2 G/100ML
INJECTION, SOLUTION INTRAVENOUS
Status: DISPENSED
Start: 2018-06-01

## (undated) RX ORDER — BUPIVACAINE HYDROCHLORIDE 2.5 MG/ML
INJECTION, SOLUTION EPIDURAL; INFILTRATION; INTRACAUDAL
Status: DISPENSED
Start: 2018-06-01

## (undated) RX ORDER — FENTANYL CITRATE 50 UG/ML
INJECTION, SOLUTION INTRAMUSCULAR; INTRAVENOUS
Status: DISPENSED
Start: 2018-06-01

## (undated) RX ORDER — GLYCOPYRROLATE 0.2 MG/ML
INJECTION, SOLUTION INTRAMUSCULAR; INTRAVENOUS
Status: DISPENSED
Start: 2018-06-01

## (undated) RX ORDER — NEOSTIGMINE METHYLSULFATE 1 MG/ML
VIAL (ML) INJECTION
Status: DISPENSED
Start: 2018-06-01

## (undated) RX ORDER — HYDROMORPHONE HYDROCHLORIDE 1 MG/ML
INJECTION, SOLUTION INTRAMUSCULAR; INTRAVENOUS; SUBCUTANEOUS
Status: DISPENSED
Start: 2018-06-01

## (undated) RX ORDER — DEXAMETHASONE SODIUM PHOSPHATE 4 MG/ML
INJECTION, SOLUTION INTRA-ARTICULAR; INTRALESIONAL; INTRAMUSCULAR; INTRAVENOUS; SOFT TISSUE
Status: DISPENSED
Start: 2018-06-01

## (undated) RX ORDER — VECURONIUM BROMIDE 1 MG/ML
INJECTION, POWDER, LYOPHILIZED, FOR SOLUTION INTRAVENOUS
Status: DISPENSED
Start: 2018-06-01

## (undated) RX ORDER — ALBUTEROL SULFATE 0.83 MG/ML
SOLUTION RESPIRATORY (INHALATION)
Status: DISPENSED
Start: 2018-06-01